# Patient Record
Sex: FEMALE | Race: BLACK OR AFRICAN AMERICAN | Employment: FULL TIME | URBAN - METROPOLITAN AREA
[De-identification: names, ages, dates, MRNs, and addresses within clinical notes are randomized per-mention and may not be internally consistent; named-entity substitution may affect disease eponyms.]

---

## 2021-07-19 LAB — PHQ9 SCORE: 1

## 2021-07-24 ENCOUNTER — TRANSFERRED RECORDS (OUTPATIENT)
Dept: HEALTH INFORMATION MANAGEMENT | Facility: CLINIC | Age: 41
End: 2021-07-24

## 2021-08-03 ENCOUNTER — TRANSFERRED RECORDS (OUTPATIENT)
Dept: HEALTH INFORMATION MANAGEMENT | Facility: CLINIC | Age: 41
End: 2021-08-03

## 2021-08-13 ENCOUNTER — TRANSFERRED RECORDS (OUTPATIENT)
Dept: HEALTH INFORMATION MANAGEMENT | Facility: CLINIC | Age: 41
End: 2021-08-13

## 2022-01-18 ENCOUNTER — OFFICE VISIT (OUTPATIENT)
Dept: FAMILY MEDICINE | Facility: CLINIC | Age: 42
End: 2022-01-18
Payer: COMMERCIAL

## 2022-01-18 VITALS
SYSTOLIC BLOOD PRESSURE: 100 MMHG | HEIGHT: 66 IN | WEIGHT: 133 LBS | DIASTOLIC BLOOD PRESSURE: 60 MMHG | OXYGEN SATURATION: 98 % | TEMPERATURE: 98.4 F | HEART RATE: 83 BPM | BODY MASS INDEX: 21.38 KG/M2

## 2022-01-18 DIAGNOSIS — N97.9 FEMALE INFERTILITY: ICD-10-CM

## 2022-01-18 DIAGNOSIS — Z13.220 LIPID SCREENING: ICD-10-CM

## 2022-01-18 DIAGNOSIS — Z00.00 ROUTINE GENERAL MEDICAL EXAMINATION AT A HEALTH CARE FACILITY: Primary | ICD-10-CM

## 2022-01-18 DIAGNOSIS — E55.9 AVITAMINOSIS D: ICD-10-CM

## 2022-01-18 DIAGNOSIS — Z12.31 ENCOUNTER FOR SCREENING MAMMOGRAM FOR BREAST CANCER: ICD-10-CM

## 2022-01-18 DIAGNOSIS — Z28.21 INFLUENZA VACCINATION DECLINED: ICD-10-CM

## 2022-01-18 LAB
ALBUMIN SERPL-MCNC: 3.5 G/DL (ref 3.4–5)
ALP SERPL-CCNC: 57 U/L (ref 40–150)
ALT SERPL W P-5'-P-CCNC: 17 U/L (ref 0–50)
ANION GAP SERPL CALCULATED.3IONS-SCNC: 9 MMOL/L (ref 3–14)
AST SERPL W P-5'-P-CCNC: 15 U/L (ref 0–45)
BILIRUB SERPL-MCNC: 0.4 MG/DL (ref 0.2–1.3)
BUN SERPL-MCNC: 11 MG/DL (ref 7–30)
CALCIUM SERPL-MCNC: 8.8 MG/DL (ref 8.5–10.1)
CHLORIDE BLD-SCNC: 109 MMOL/L (ref 94–109)
CO2 SERPL-SCNC: 21 MMOL/L (ref 20–32)
CREAT SERPL-MCNC: 0.71 MG/DL (ref 0.52–1.04)
DEPRECATED CALCIDIOL+CALCIFEROL SERPL-MC: 11 UG/L (ref 20–75)
ERYTHROCYTE [DISTWIDTH] IN BLOOD BY AUTOMATED COUNT: 15.4 % (ref 10–15)
GFR SERPL CREATININE-BSD FRML MDRD: >90 ML/MIN/1.73M2
GLUCOSE BLD-MCNC: 90 MG/DL (ref 70–99)
HCT VFR BLD AUTO: 37.6 % (ref 35–47)
HCV AB SERPL QL IA: NONREACTIVE
HGB BLD-MCNC: 12 G/DL (ref 11.7–15.7)
HIV 1+2 AB+HIV1 P24 AG SERPL QL IA: NONREACTIVE
MCH RBC QN AUTO: 26.9 PG (ref 26.5–33)
MCHC RBC AUTO-ENTMCNC: 31.9 G/DL (ref 31.5–36.5)
MCV RBC AUTO: 84 FL (ref 78–100)
PLATELET # BLD AUTO: 211 10E3/UL (ref 150–450)
POTASSIUM BLD-SCNC: 3.6 MMOL/L (ref 3.4–5.3)
PROT SERPL-MCNC: 7.7 G/DL (ref 6.8–8.8)
RBC # BLD AUTO: 4.46 10E6/UL (ref 3.8–5.2)
SODIUM SERPL-SCNC: 139 MMOL/L (ref 133–144)
TSH SERPL DL<=0.005 MIU/L-ACNC: 2.35 MU/L (ref 0.4–4)
WBC # BLD AUTO: 3.6 10E3/UL (ref 4–11)

## 2022-01-18 PROCEDURE — 84443 ASSAY THYROID STIM HORMONE: CPT | Performed by: FAMILY MEDICINE

## 2022-01-18 PROCEDURE — 82306 VITAMIN D 25 HYDROXY: CPT | Performed by: FAMILY MEDICINE

## 2022-01-18 PROCEDURE — 86803 HEPATITIS C AB TEST: CPT | Performed by: FAMILY MEDICINE

## 2022-01-18 PROCEDURE — 80053 COMPREHEN METABOLIC PANEL: CPT | Performed by: FAMILY MEDICINE

## 2022-01-18 PROCEDURE — 99386 PREV VISIT NEW AGE 40-64: CPT | Performed by: FAMILY MEDICINE

## 2022-01-18 PROCEDURE — 85027 COMPLETE CBC AUTOMATED: CPT | Performed by: FAMILY MEDICINE

## 2022-01-18 PROCEDURE — 99213 OFFICE O/P EST LOW 20 MIN: CPT | Mod: 25 | Performed by: FAMILY MEDICINE

## 2022-01-18 PROCEDURE — 87389 HIV-1 AG W/HIV-1&-2 AB AG IA: CPT | Performed by: FAMILY MEDICINE

## 2022-01-18 PROCEDURE — 36415 COLL VENOUS BLD VENIPUNCTURE: CPT | Performed by: FAMILY MEDICINE

## 2022-01-18 ASSESSMENT — ENCOUNTER SYMPTOMS
SORE THROAT: 0
ARTHRALGIAS: 0
CONSTIPATION: 0
HEARTBURN: 0
NERVOUS/ANXIOUS: 0
COUGH: 0
PARESTHESIAS: 0
BREAST MASS: 0
WEAKNESS: 0
DIARRHEA: 0
HEADACHES: 0
DIZZINESS: 0
JOINT SWELLING: 0
HEMATOCHEZIA: 0
FEVER: 0
SHORTNESS OF BREATH: 0
EYE PAIN: 0
CHILLS: 0
MYALGIAS: 0
NAUSEA: 0
PALPITATIONS: 0
HEMATURIA: 0
DYSURIA: 0
ABDOMINAL PAIN: 0
FREQUENCY: 0

## 2022-01-18 ASSESSMENT — MIFFLIN-ST. JEOR: SCORE: 1282.09

## 2022-01-18 NOTE — PROGRESS NOTES
SUBJECTIVE:   CC: Checo Hong is an 42 year old woman who presents for preventive health visit.     Patient has been advised of split billing requirements and indicates understanding: Yes  Healthy Habits:     Getting at least 3 servings of Calcium per day:  Yes    Bi-annual eye exam:  NO    Dental care twice a year:  Yes    Sleep apnea or symptoms of sleep apnea:  None    Diet:  Regular (no restrictions)    Frequency of exercise:  None    Taking medications regularly:  Not Applicable    Medication side effects:  Not applicable    PHQ-2 Total Score: 0    Additional concerns today:  Yes    She was previously going to [facility].   H/o two miscarriages - second one 4 months ago.     Today's PHQ-2 Score:   PHQ-2 ( 1999 [employer]) 1/18/2022   Q1: Little interest or pleasure in doing things 0   Q2: Feeling down, depressed or hopeless 0   PHQ-2 Score 0   Q1: Little interest or pleasure in doing things Not at all   Q2: Feeling down, depressed or hopeless Not at all   PHQ-2 Score 0       Abuse: Current or Past (Physical, Sexual or Emotional) - No  Do you feel safe in your environment? No    Have you ever done Advance Care Planning? (For example, a Health Directive, POLST, or a discussion with a medical provider or your loved ones about your wishes): No, advance care planning information given to patient to review.  Patient plans to discuss their wishes with loved ones or provider.      Social History     Tobacco Use     Smoking status: Not on file     Smokeless tobacco: Not on file   Substance Use Topics     Alcohol use: Not on file         Alcohol Use 1/18/2022   Prescreen: >3 drinks/day or >7 drinks/week? No       Reviewed orders with patient.  Reviewed health maintenance and updated orders accordingly - Yes      Breast Cancer Screening:    Breast CA Risk Assessment (FHS-7) 1/18/2022   Do you have a family history of breast, colon, or ovarian cancer? No / Unknown         Pertinent mammograms are reviewed under  "the imaging tab.    History of abnormal Pap smear: NO - age 30- 65 PAP every 3 years recommended     Reviewed and updated as needed this visit by clinical staff                Reviewed and updated as needed this visit by Provider                   Review of Systems   Constitutional: Negative for chills and fever.   HENT: Negative for congestion, ear pain, hearing loss and sore throat.    Eyes: Positive for visual disturbance. Negative for pain.   Respiratory: Negative for cough and shortness of breath.    Cardiovascular: Negative for chest pain, palpitations and peripheral edema.   Gastrointestinal: Negative for abdominal pain, constipation, diarrhea, heartburn, hematochezia and nausea.   Breasts:  Negative for tenderness, breast mass and discharge.   Genitourinary: Negative for dysuria, frequency, genital sores, hematuria, pelvic pain, urgency, vaginal bleeding and vaginal discharge.   Musculoskeletal: Negative for arthralgias, joint swelling and myalgias.   Skin: Negative for rash.   Neurological: Negative for dizziness, weakness, headaches and paresthesias.   Psychiatric/Behavioral: Negative for mood changes. The patient is not nervous/anxious.        OBJECTIVE:   Physical Exam   /60   Pulse 83   Temp 98.4  F (36.9  C)   Ht 1.664 m (5' 5.5\")   Wt 60.3 kg (133 lb)   LMP 12/10/2021   SpO2 98%   Breastfeeding No   BMI 21.80 kg/m    GENERAL: healthy, alert and no distress  EYES: Eyes grossly normal to inspection,conjunctivae and sclerae normal  HENT: ear canals and TM's normal, nose and mouth without ulcers or lesions  NECK: no adenopathy, no asymmetry, masses, or scars and thyroid normal to palpation  RESP: lungs clear to auscultation - no rales, rhonchi or wheezes  CV: regular rate and rhythm, normal S1 S2  ABDOMEN: soft, nontender, no hepatosplenomegaly, no masses and bowel sounds normal  MS: no gross musculoskeletal defects noted, no edema  SKIN: no suspicious lesions or rashes  NEURO: Normal " strength and tone, mentation intact and speech normal  PSYCH: mentation appears normal, affect normal/bright    Diagnostic Test Results:  Labs reviewed in Epic    ASSESSMENT/PLAN:       1. Routine general medical examination at a health care facility  Up to date with tetanus booster, received six years ago.   Per pt no history of abnormal pap smear. She has a pap smear 4 months ago which was normal. I am unable to see it in care everywhere and will obtain SAMM. In reviewing chart there was GYN order placed for abnormal pap and pt needed colposcopy  - Follow-up, discuss need for colpo for ASCUS/HPV. ASCUS and positive HPV - Needs a colposcopy - referral made     - CBC with platelets; Future  - Comprehensive metabolic panel (BMP + Alb, Alk Phos, ALT, AST, Total. Bili, TP); Future  - TSH with free T4 reflex; Future  - Vitamin D Deficiency; Future  - HIV Antigen Antibody Combo; Future  - Hepatitis C Screen Reflex to HCV RNA Quant and Genotype; Future  - REVIEW OF HEALTH MAINTENANCE PROTOCOL ORDERS  - CBC with platelets  - Comprehensive metabolic panel (BMP + Alb, Alk Phos, ALT, AST, Total. Bili, TP)  - TSH with free T4 reflex  - Vitamin D Deficiency  - HIV Antigen Antibody Combo  - Hepatitis C Screen Reflex to HCV RNA Quant and Genotype    2. Female infertility  - H/o two miscarriages, recent miscarriage 4 months ago. She is currently taking prenatal vitamins.   - Ob/Gyn Referral; Future    3. Encounter for screening mammogram for breast cancer  - MA Screening Digital Bilateral; Future    4. Influenza vaccination declined    5. Lipid screening   - lipid panel     Vitamin D levels low - tx Vitamin D 50, 000 U once per week X 12 weeks. Recheck labs in 12 weeks.  WBC low, recheck in 12 weeks.    Patient has been advised of split billing requirements and indicates understanding: Yes  COUNSELING:  Reviewed preventive health counseling, as reflected in patient instructions    There is no height or weight on file to calculate  BMI.        She has no history on file for tobacco use.      Counseling Resources:  ATP IV Guidelines  Pooled Cohorts Equation Calculator  Breast Cancer Risk Calculator  BRCA-Related Cancer Risk Assessment: FHS-7 Tool  FRAX Risk Assessment  ICSI Preventive Guidelines  Dietary Guidelines for Americans, 2010  USDA's MyPlate  ASA Prophylaxis  Lung CA Screening    Jimmy Bae MD  [facility]

## 2022-01-28 ENCOUNTER — TELEPHONE (OUTPATIENT)
Dept: FAMILY MEDICINE | Facility: CLINIC | Age: 42
End: 2022-01-28
Payer: COMMERCIAL

## 2022-01-28 DIAGNOSIS — D72.9 ABNORMAL WBC COUNT: Primary | ICD-10-CM

## 2022-01-28 RX ORDER — ERGOCALCIFEROL 1.25 MG/1
50000 CAPSULE, LIQUID FILLED ORAL WEEKLY
Qty: 12 CAPSULE | Refills: 0 | Status: SHIPPED | OUTPATIENT
Start: 2022-01-28 | End: 2022-04-16

## 2022-01-28 NOTE — TELEPHONE ENCOUNTER
Called patient with [employer]  #[phone]. Left voice mail message asking for patient to return call.    OB/GYN referral information:    [address] [address]   Phone: [phone]     Tracey Wu RN  [facility]

## 2022-01-28 NOTE — TELEPHONE ENCOUNTER
RN, I reviewed pts chart. She has h/o abnormal pap and would need colposcopy. If she hasn't had colp then please have her schedule with GYN.    Vitamin D low, tx Vitamin D 50, 000 U once per week X 12 weeks. Needs lab only visit after completing tx.     White count low, needs to recheck with next lab draw.     Rest of labs are stable.    Thanks!  DM

## 2022-02-01 NOTE — TELEPHONE ENCOUNTER
Writer called patient with [employer]  #[phone]: patient answered phone and stated she was unable to speak on phone because she is at work.    Patient stated she will call back tomorrow.  Patient requested clinic number be left on voicemail.    With help of [employer]  #[phone] writer left voicemail for patient with clinic number-ask to speak with a triage nurse.    AMEE BellaN, RN  [facility]

## 2022-02-03 NOTE — TELEPHONE ENCOUNTER
Pt called with [employer]     No answer, message was left for her to call us back.     Dr Bae, After it is established with pt if she needs a colposcopy or not would she need another referral placed as there is an OBGYN referral for infertility dated 1/18/22 but this is now a different issue or will this cover both issues?.    Flakita Edgar, RN, BSN  [facility]

## 2022-02-03 NOTE — TELEPHONE ENCOUNTER
Patient called with spouse. Caller advised per provider message below. They indicate understanding of the result and instructions for follow up and continued care. Lab appt scheduled 4/8/22 and call was transferred to [facility] to schedule for colp. They wanted [facility], provided them with # [phone]. They will also  rx. No further questions. Thank you.

## 2022-02-10 ENCOUNTER — TELEPHONE (OUTPATIENT)
Dept: OBGYN | Facility: CLINIC | Age: 42
End: 2022-02-10
Payer: COMMERCIAL

## 2022-02-10 NOTE — TELEPHONE ENCOUNTER
Routed to RD triage, [facility]. She needs to establish with GYN before colp can be scheduled. She can schedule wherever she wants to however based on messages it does not seem like she would want to be seen in [facility] which is where this clinic is located. Routed back to patient's FP.

## 2022-02-10 NOTE — TELEPHONE ENCOUNTER
Pls call pt's spouse Afshan to reschedule the [place] appt. He just got his new work schedule. The only day he can take her is 2/21 before 230. Pls call him asap to reschedule the [place] appt for next week. Thanks

## 2022-02-10 NOTE — TELEPHONE ENCOUNTER
Called pts spouse, left VM with phone number to schedule appt with GYN    AMEE YanezN RN  [facility]

## 2022-02-16 NOTE — TELEPHONE ENCOUNTER
Called number on pt's chart (no pt contacts listed) in the morning, was told to call back after 1:30. Called at 2:07 and LVM 2/16     Edit: spot is currently on hold on 3/24 with [facility] for r/s appt

## 2022-02-18 NOTE — TELEPHONE ENCOUNTER
Called pt, who gave us her spouse's #. Called spouse and scheduled a consult for 3/11 with Dr. Aggarwal due to [facility] being booked out. 2/18 SH

## 2022-03-11 ENCOUNTER — OFFICE VISIT (OUTPATIENT)
Dept: OBGYN | Facility: CLINIC | Age: 42
End: 2022-03-11
Payer: COMMERCIAL

## 2022-03-11 VITALS
SYSTOLIC BLOOD PRESSURE: 102 MMHG | BODY MASS INDEX: 22.4 KG/M2 | HEART RATE: 80 BPM | WEIGHT: 136.7 LBS | DIASTOLIC BLOOD PRESSURE: 64 MMHG | TEMPERATURE: 98.1 F | OXYGEN SATURATION: 97 %

## 2022-03-11 DIAGNOSIS — N96 HISTORY OF RECURRENT MISCARRIAGES: Primary | ICD-10-CM

## 2022-03-11 DIAGNOSIS — R87.610 ATYPICAL SQUAMOUS CELLS OF UNDETERMINED SIGNIFICANCE (ASCUS) ON PAPANICOLAOU SMEAR OF CERVIX: ICD-10-CM

## 2022-03-11 PROCEDURE — 99203 OFFICE O/P NEW LOW 30 MIN: CPT | Performed by: OBSTETRICS & GYNECOLOGY

## 2022-03-11 NOTE — PATIENT INSTRUCTIONS
If you would like to use the home ovulation tests, you should start testing every day starting on day 10 of your cycle (day 1 is the first day that you have bleeding from your period). You should also start having intercourse every day or every other day until the day after you have a positive test.     You should come to the lab on the third day of your period to have your blood work checked. You can go to any [place] lab. We would also recommend doing a test to check the uterus and fallopian tubes called an HSG (hysterosalpingogram). To schedule this test, please call our clinic at the start of your next period.     We would also recommend a Semen analysis, you can call [phone] ([phone]) to schedule.

## 2022-03-11 NOTE — PROGRESS NOTES
GYN Progress Note     CC: Follow up for infertility, recurrent pregnancy loss   HISTORY OF PRESENT ILLNESS:    Checo Hong is a 42 year old  who presents for evaluation of recurrent pregnancy loss. She presents today with her  who she requests to have act as her . She reports an early miscarriage about three years ago which resolved without medication or surgery and then it took 2 years for her to become pregnant again but that pregnancy ended in a spontaneous miscarriage at 5-6 weeks gestation in 2021 which resolved with expectant management. They have been trying to conceive since the miscarriage. They have been trying to time intercourse during her fertile window of the cycle (usually aiming for day 14 of her cycle) but she has not been using ovulation prediction tests.     She did have ovarian reserve testing about a year ago which showed a FSH of 5.8 and Estradiol of 84, AMH not done. She has not had any additional testing for recurrent miscarriage or fertility. Her partner has not had a semen analysis.      OB HISTORY:  OB History    Para Term  AB Living   2 0 0 0 2 0   SAB IAB Ectopic Multiple Live Births   2 0 0 0 0      # Outcome Date GA Lbr William/2nd Weight Sex Delivery Anes PTL Lv   2 SAB            1 SAB                     GYN HISTORY:  She has a reported history of an ASCUS pap with +HR HPV through [employer] in an office note on 21, unable to see actual pap smear result. She was referred for a colposcopy but that has not been done yet.   Denies history of STIs.   Reports regular periods every 28 days        PAST MEDICAL HISTORY:  Past Medical History:   Diagnosis Date     Vitamin D deficiency             PAST SURGICAL HISTORY:  Past Surgical History:   Procedure Laterality Date     NO HISTORY OF SURGERY            CURRENT MEDICATIONS:   Current Outpatient Medications   Medication Sig Dispense Refill     vitamin D2 (ERGOCALCIFEROL) 50309 units (1250  mcg) capsule Take 1 capsule (50,000 Units) by mouth once a week for 12 doses 12 capsule 0            ALLERGIES:  Patient has no known allergies.         SOCIAL HISTORY:  Social History     Tobacco Use     Smoking status: Never Smoker     Smokeless tobacco: Never Used   Substance Use Topics     Alcohol use: Never     Drug use: Never            FAMILY HISTORY:  No family history on file.     PHYSICAL EXAMINATION:  VS:/64   Pulse 80   Temp 98.1  F (36.7  C)   Wt 62 kg (136 lb 11.2 oz)   LMP 2022 (Exact Date)   SpO2 97%   Breastfeeding No   BMI 22.40 kg/m    Body mass index is 22.4 kg/m .    Physical exam deferred     Laboratory values:  Imaging findings:        ASSESSMENT:  Checo Hong is a 42 year old  who presents for evaluation of recurrent miscarriage and an ASCUS pap smear with +HR HPV     PLAN:  (N96) History of recurrent miscarriages  (primary encounter diagnosis)  -We reviewed etiologies of recurrent miscarriage and discussed that we would typically recommend evaluation after 2-3 consecutive miscarriages and after reviewing the options for further evaluation they would like to proceed with this testing. We reviewed the plan to check for antiphospholipid antibodies and plan for an HSG to assess for uterine anomalies as well as for tubal patency. She and her partner deny any known familial history of genetic disorders. We discussed the option for further testing but they will hold off on karyotype testing at this time.  -We also discussed that the most common etiology for miscarriage is sporadic chromosomal abnormalities and unfortunately this risk increases with advancing maternal age. We reviewed that the risk of miscarriage for women 40-44 years old is about 50% and that this is likely a contributing factor to her recent miscarriages.   -We also reviewed that since they have been trying to conceive for >6 months since their most recent miscarriage, I would recommend further testing  with ovarian reserve labs and a semen analysis for her partner.   -I also discussed that given her age and fertility concerns that I would recommend a referral with [facility] and that while it is possible for her to conceive on her own, she may need further interventions like IVF. They decline at this time and she would like to complete the above testing and then reassess.   -We also discussed monitoring her cycles with home ovulation tests and how to time intercourse to the most fertile time of her cycle.     (R87.610) Atypical squamous cells of undetermined significance (ASCUS) on Papanicolaou smear of cervix  -Message sent to scheduling team to schedule patient for colposcopy due to a history of an ASCUS pap smear with +HR HPV in 8/2021 (based on review of notes and patient's report, actual cytology report not visible in CareEverywhere).     Dispo: RTC for colposcopy     Shannon Aggarwal MD

## 2022-03-15 ENCOUNTER — TELEPHONE (OUTPATIENT)
Dept: OBGYN | Facility: CLINIC | Age: 42
End: 2022-03-15
Payer: COMMERCIAL

## 2022-03-15 NOTE — TELEPHONE ENCOUNTER
----- Message from Shannon Aggarwal MD sent at 3/12/2022  4:21 PM CST -----  Regarding: Colposcopy  Hi,     Can someone please help set Checo up for a colposcopy with me. She request that her  be called at [phone] to schedule this appointment.     Thank you!  Shannon

## 2022-03-25 ENCOUNTER — MYC MEDICAL ADVICE (OUTPATIENT)
Dept: OBGYN | Facility: CLINIC | Age: 42
End: 2022-03-25

## 2022-03-25 ENCOUNTER — LAB (OUTPATIENT)
Dept: LAB | Facility: CLINIC | Age: 42
End: 2022-03-25
Payer: COMMERCIAL

## 2022-03-25 DIAGNOSIS — N96 HISTORY OF RECURRENT MISCARRIAGES: ICD-10-CM

## 2022-03-25 DIAGNOSIS — Z13.220 LIPID SCREENING: ICD-10-CM

## 2022-03-25 DIAGNOSIS — D72.9 ABNORMAL WBC COUNT: ICD-10-CM

## 2022-03-25 DIAGNOSIS — E55.9 AVITAMINOSIS D: ICD-10-CM

## 2022-03-25 LAB
CHOLEST SERPL-MCNC: 157 MG/DL
DEPRECATED CALCIDIOL+CALCIFEROL SERPL-MC: 30 UG/L (ref 20–75)
ERYTHROCYTE [DISTWIDTH] IN BLOOD BY AUTOMATED COUNT: 14.4 % (ref 10–15)
ESTRADIOL SERPL-MCNC: 76 PG/ML
FASTING STATUS PATIENT QL REPORTED: YES
FSH SERPL-ACNC: 7.4 IU/L
HCT VFR BLD AUTO: 38.7 % (ref 35–47)
HDLC SERPL-MCNC: 42 MG/DL
HGB BLD-MCNC: 12.3 G/DL (ref 11.7–15.7)
LDLC SERPL CALC-MCNC: 93 MG/DL
MCH RBC QN AUTO: 27.8 PG (ref 26.5–33)
MCHC RBC AUTO-ENTMCNC: 31.8 G/DL (ref 31.5–36.5)
MCV RBC AUTO: 87 FL (ref 78–100)
NONHDLC SERPL-MCNC: 115 MG/DL
PLATELET # BLD AUTO: 226 10E3/UL (ref 150–450)
RBC # BLD AUTO: 4.43 10E6/UL (ref 3.8–5.2)
TRIGL SERPL-MCNC: 108 MG/DL
WBC # BLD AUTO: 3.7 10E3/UL (ref 4–11)

## 2022-03-25 PROCEDURE — 36415 COLL VENOUS BLD VENIPUNCTURE: CPT

## 2022-03-25 PROCEDURE — 83001 ASSAY OF GONADOTROPIN (FSH): CPT

## 2022-03-25 PROCEDURE — 83520 IMMUNOASSAY QUANT NOS NONAB: CPT | Mod: 90

## 2022-03-25 PROCEDURE — 99000 SPECIMEN HANDLING OFFICE-LAB: CPT

## 2022-03-25 PROCEDURE — 85730 THROMBOPLASTIN TIME PARTIAL: CPT

## 2022-03-25 PROCEDURE — 85027 COMPLETE CBC AUTOMATED: CPT

## 2022-03-25 PROCEDURE — 80061 LIPID PANEL: CPT

## 2022-03-25 PROCEDURE — 85390 FIBRINOLYSINS SCREEN I&R: CPT | Performed by: PATHOLOGY

## 2022-03-25 PROCEDURE — 85613 RUSSELL VIPER VENOM DILUTED: CPT

## 2022-03-25 PROCEDURE — 86146 BETA-2 GLYCOPROTEIN ANTIBODY: CPT

## 2022-03-25 PROCEDURE — 82306 VITAMIN D 25 HYDROXY: CPT

## 2022-03-25 PROCEDURE — 82670 ASSAY OF TOTAL ESTRADIOL: CPT

## 2022-03-28 LAB
B2 GLYCOPROT1 IGG SERPL IA-ACNC: 2 U/ML
B2 GLYCOPROT1 IGM SERPL IA-ACNC: 13 U/ML
DRVVT SCREEN RATIO: 1.03
INR PPP: 0.99 (ref 0.85–1.15)
LA PPP-IMP: NEGATIVE
LUPUS INTERPRETATION: NORMAL
PTT RATIO: 0.87
THROMBIN TIME: 17 SECONDS (ref 13–19)

## 2022-03-30 LAB — MIS SERPL-MCNC: 2.59 NG/ML

## 2022-04-04 ENCOUNTER — OFFICE VISIT (OUTPATIENT)
Dept: OBGYN | Facility: CLINIC | Age: 42
End: 2022-04-04
Payer: COMMERCIAL

## 2022-04-04 VITALS
BODY MASS INDEX: 22.09 KG/M2 | HEART RATE: 54 BPM | OXYGEN SATURATION: 100 % | SYSTOLIC BLOOD PRESSURE: 112 MMHG | TEMPERATURE: 97.7 F | DIASTOLIC BLOOD PRESSURE: 70 MMHG | WEIGHT: 134.8 LBS

## 2022-04-04 DIAGNOSIS — N97.9 INFERTILITY, FEMALE, SECONDARY: ICD-10-CM

## 2022-04-04 DIAGNOSIS — R87.810 ASCUS WITH POSITIVE HIGH RISK HPV CERVICAL: Primary | ICD-10-CM

## 2022-04-04 DIAGNOSIS — N96 HISTORY OF RECURRENT MISCARRIAGES: ICD-10-CM

## 2022-04-04 DIAGNOSIS — R87.610 ASCUS WITH POSITIVE HIGH RISK HPV CERVICAL: Primary | ICD-10-CM

## 2022-04-04 LAB — HCG UR QL: NEGATIVE

## 2022-04-04 PROCEDURE — 99213 OFFICE O/P EST LOW 20 MIN: CPT | Mod: 25 | Performed by: OBSTETRICS & GYNECOLOGY

## 2022-04-04 PROCEDURE — 57454 BX/CURETT OF CERVIX W/SCOPE: CPT | Performed by: OBSTETRICS & GYNECOLOGY

## 2022-04-04 PROCEDURE — 88305 TISSUE EXAM BY PATHOLOGIST: CPT | Performed by: PATHOLOGY

## 2022-04-04 PROCEDURE — 81025 URINE PREGNANCY TEST: CPT | Performed by: OBSTETRICS & GYNECOLOGY

## 2022-04-04 NOTE — PATIENT INSTRUCTIONS
Colposcopy Post-Procedure Patient Instructions    You may experience any of the following for a couple of days following colposcopy:    Mild cramping    Vaginal bleeding     Vaginal discharge that looks black and clumpy    Please call your healthcare provider if you have any of the following symptoms:    Fever--Temperature greater than 100 degrees    Cramping after 48 hours    Bleeding heavier than a normal period in the first 24-48 hours    If you are bleeding and soaking more than one pad an hour    Or any other worrisome problems.    We recommend that:    You use pads, not tampons for the bleeding.    You may resume sexual activity in 2-3 days, or after bleeding stops.    You may use Tylenol or ibuprofen (Motrin or Advil) for any discomfort.      [facility] - OB/GYN : [phone]    Please call our clinic to schedule the HSG (hysterosalpingogram). You should call on the first day of your period.     Here is the information for the fertility clinics we discussed:     [facility]   [email or web address]     [place] location:  [facility]  [address], [address]  [place], [place] [address] [phone]     [place] location:  [facility]  [address]  [place], [place] [id number] [phone]  __________________________________________________    [facility] - [place]  [address] [place]  [phone]  __________________________________________________    [facility]  [email or web address]    [place] Location:  [address], [address]  [place] [address]     [place] Location:  [address], [address]  [place] [address]  [phone]

## 2022-04-05 NOTE — PROGRESS NOTES
Office Colposcopy Procedure Note     CC: Evaluation of a ASCUS pap with +HR HPV     HPI:  Birth control method: none, trying to conceive                                                           Patient's pap smear history is as follows:  ASCUS Pap with +HR HPV in 8/2021, no other abnormal pap smears per patient report     Urine HCG: Negative     PROCEDURE NOTE:    We discussed that based on her pap testing we recommend further evaluation. She understands our recommendation to proceed with colposcopy. We discussed that colposcopy allows further evaluation of the cervix using magnification. I counseled the patient that biopsies may also be done at the time of the colposcopy. I discussed the procedure of colposcopy and counseled her that the colposcope magnifies the appearance of the cervix. Acetic acid or vinegar is placed on the cervix and vagina to stain the cells and to allow the clinician to better see where the abnormal cells are located and the size of any abnormal areas. The size, type and location of abnormal cells help to determine which area or areas may need to be biopsied. This information will further determine how severe the abnormality is and also help to determine what treatment, if any, is needed. When monitored and treated early, pre-cancerous areas usually do not develop into cervical cancer.       Written consent was obtained after ASCCP management guidelines were discussed and all patient's questions were answered. Final verification with two patient identifiers was performed.       Colposcopic exam of the cervix was performed before and after application of 3% acetic acid.  Adequate colposcopic evaluation (entire SCJ/transformation zone seen, no lesion in canal)  Acetowhite lesions: circumfrentailly around the cervical os in a geographic pattern.     Punctations: none    Mosaicism: none    Atypical vessels: none         2 biopsy(ies) taken.   Hemostasis obtained with silver nitrate.  ECC done  with Kevorkian curette and endocervical brush.         Colposcopic impression: FLACA I        Assessment/Plan  -Evaluation of a ASCUS pap smear   -Biopsies and ECC obtained, will contact patient with results and follow up plan   -Recommend pelvic rest for 72 hours, discussed bleeding/infection    Dispo: pending results of biopsy     Shannon Aggarwal MD

## 2022-04-06 LAB
PATH REPORT.COMMENTS IMP SPEC: NORMAL
PATH REPORT.COMMENTS IMP SPEC: NORMAL
PATH REPORT.FINAL DX SPEC: NORMAL
PATH REPORT.GROSS SPEC: NORMAL
PATH REPORT.MICROSCOPIC SPEC OTHER STN: NORMAL
PATH REPORT.RELEVANT HX SPEC: NORMAL
PHOTO IMAGE: NORMAL

## 2022-04-08 ENCOUNTER — TELEPHONE (OUTPATIENT)
Dept: OBGYN | Facility: CLINIC | Age: 42
End: 2022-04-08

## 2022-04-08 ENCOUNTER — LAB (OUTPATIENT)
Dept: LAB | Facility: CLINIC | Age: 42
End: 2022-04-08
Payer: COMMERCIAL

## 2022-04-08 DIAGNOSIS — N96 HISTORY OF RECURRENT MISCARRIAGES: Primary | ICD-10-CM

## 2022-04-08 PROCEDURE — 86146 BETA-2 GLYCOPROTEIN ANTIBODY: CPT | Mod: 59

## 2022-04-08 PROCEDURE — 86147 CARDIOLIPIN ANTIBODY EA IG: CPT | Mod: 59

## 2022-04-08 PROCEDURE — 36415 COLL VENOUS BLD VENIPUNCTURE: CPT

## 2022-04-08 NOTE — TELEPHONE ENCOUNTER
Result Care Coordination      Result Notes     Shannon Aggarwal MD   4/7/2022  5:46 PM CDT Back to Top        Please review colposcopy results with patient. All of her biopsies/ECC were negative for dysplasia and I would recommend a repeat pap smear in one year.      Thank you!  Shannon Aggarwal             [facility] with  #[phone] for patient to call back.   Trudi MERCADO RN

## 2022-04-08 NOTE — LETTER
[facility]  [address] [place] [address]  [facility] [address]  [phone]        April 14, 2022    Checo Hong                                                                                                            [address]  [place] [address]              Dear Checo,    I have reviewed your recent laboratory tests.  I am pleased to report that the following tests were normal.    Colposcopy pathology results. Negative for dysplasia. Your provider is recommending that come back in 1 year for repeat PAP smear.    If you have any problems or concerns, please call myself or my nurse at [phone]      Sincerely,        Shannon Aggarwal MD

## 2022-04-09 LAB
B2 GLYCOPROT1 IGG SERPL IA-ACNC: 1.8 U/ML
B2 GLYCOPROT1 IGM SERPL IA-ACNC: 12 U/ML
CARDIOLIPIN IGG SER IA-ACNC: 2.8 GPL-U/ML
CARDIOLIPIN IGG SER IA-ACNC: NEGATIVE
CARDIOLIPIN IGM SER IA-ACNC: 13 MPL-U/ML
CARDIOLIPIN IGM SER IA-ACNC: ABNORMAL

## 2022-04-12 NOTE — TELEPHONE ENCOUNTER
TC to Checo, no answer and no voicemail option. Will attempt TC one more time before sending letter.  Joya Tejeda RN

## 2022-05-29 ENCOUNTER — HEALTH MAINTENANCE LETTER (OUTPATIENT)
Age: 42
End: 2022-05-29

## 2022-07-11 ENCOUNTER — OFFICE VISIT (OUTPATIENT)
Dept: URGENT CARE | Facility: URGENT CARE | Age: 42
End: 2022-07-11
Payer: COMMERCIAL

## 2022-07-11 ENCOUNTER — DOCUMENTATION ONLY (OUTPATIENT)
Dept: LAB | Facility: CLINIC | Age: 42
End: 2022-07-11

## 2022-07-11 VITALS
DIASTOLIC BLOOD PRESSURE: 78 MMHG | BODY MASS INDEX: 21.05 KG/M2 | HEART RATE: 78 BPM | HEIGHT: 66 IN | OXYGEN SATURATION: 99 % | SYSTOLIC BLOOD PRESSURE: 114 MMHG | TEMPERATURE: 98.8 F | WEIGHT: 131 LBS | RESPIRATION RATE: 16 BRPM

## 2022-07-11 DIAGNOSIS — A04.8 H. PYLORI INFECTION: Primary | ICD-10-CM

## 2022-07-11 DIAGNOSIS — K21.9 GASTROESOPHAGEAL REFLUX DISEASE, UNSPECIFIED WHETHER ESOPHAGITIS PRESENT: ICD-10-CM

## 2022-07-11 DIAGNOSIS — D70.9 NEUTROPENIA, UNSPECIFIED TYPE (H): ICD-10-CM

## 2022-07-11 DIAGNOSIS — R10.13 ABDOMINAL PAIN, EPIGASTRIC: ICD-10-CM

## 2022-07-11 LAB
ALBUMIN UR-MCNC: NEGATIVE MG/DL
APPEARANCE UR: CLEAR
BACTERIA #/AREA URNS HPF: ABNORMAL /HPF
BASOPHILS # BLD AUTO: 0 10E3/UL (ref 0–0.2)
BASOPHILS NFR BLD AUTO: 0 %
BILIRUB UR QL STRIP: NEGATIVE
COLOR UR AUTO: YELLOW
EOSINOPHIL # BLD AUTO: 0 10E3/UL (ref 0–0.7)
EOSINOPHIL NFR BLD AUTO: 1 %
ERYTHROCYTE [DISTWIDTH] IN BLOOD BY AUTOMATED COUNT: 14.8 % (ref 10–15)
GLUCOSE UR STRIP-MCNC: NEGATIVE MG/DL
HCG UR QL: NEGATIVE
HCT VFR BLD AUTO: 38.6 % (ref 35–47)
HGB BLD-MCNC: 12.6 G/DL (ref 11.7–15.7)
HGB UR QL STRIP: ABNORMAL
IMM GRANULOCYTES # BLD: 0 10E3/UL
IMM GRANULOCYTES NFR BLD: 0 %
KETONES UR STRIP-MCNC: NEGATIVE MG/DL
LEUKOCYTE ESTERASE UR QL STRIP: NEGATIVE
LIPASE SERPL-CCNC: 25 U/L (ref 13–60)
LYMPHOCYTES # BLD AUTO: 1.2 10E3/UL (ref 0.8–5.3)
LYMPHOCYTES NFR BLD AUTO: 43 %
MCH RBC QN AUTO: 28.3 PG (ref 26.5–33)
MCHC RBC AUTO-ENTMCNC: 32.6 G/DL (ref 31.5–36.5)
MCV RBC AUTO: 87 FL (ref 78–100)
MONOCYTES # BLD AUTO: 0.3 10E3/UL (ref 0–1.3)
MONOCYTES NFR BLD AUTO: 11 %
NEUTROPHILS # BLD AUTO: 1.2 10E3/UL (ref 1.6–8.3)
NEUTROPHILS NFR BLD AUTO: 44 %
NITRATE UR QL: NEGATIVE
PH UR STRIP: 7 [PH] (ref 5–7)
PLATELET # BLD AUTO: 224 10E3/UL (ref 150–450)
RBC # BLD AUTO: 4.46 10E6/UL (ref 3.8–5.2)
RBC #/AREA URNS AUTO: ABNORMAL /HPF
SP GR UR STRIP: 1.01 (ref 1–1.03)
UROBILINOGEN UR STRIP-ACNC: 0.2 E.U./DL
WBC # BLD AUTO: 2.7 10E3/UL (ref 4–11)
WBC #/AREA URNS AUTO: ABNORMAL /HPF

## 2022-07-11 PROCEDURE — 36415 COLL VENOUS BLD VENIPUNCTURE: CPT | Performed by: PHYSICIAN ASSISTANT

## 2022-07-11 PROCEDURE — 81025 URINE PREGNANCY TEST: CPT | Performed by: PHYSICIAN ASSISTANT

## 2022-07-11 PROCEDURE — 85025 COMPLETE CBC W/AUTO DIFF WBC: CPT | Performed by: PHYSICIAN ASSISTANT

## 2022-07-11 PROCEDURE — 82150 ASSAY OF AMYLASE: CPT | Performed by: PHYSICIAN ASSISTANT

## 2022-07-11 PROCEDURE — 83690 ASSAY OF LIPASE: CPT | Performed by: PHYSICIAN ASSISTANT

## 2022-07-11 PROCEDURE — 81001 URINALYSIS AUTO W/SCOPE: CPT | Performed by: PHYSICIAN ASSISTANT

## 2022-07-11 PROCEDURE — 99214 OFFICE O/P EST MOD 30 MIN: CPT | Performed by: PHYSICIAN ASSISTANT

## 2022-07-11 PROCEDURE — 80053 COMPREHEN METABOLIC PANEL: CPT | Performed by: PHYSICIAN ASSISTANT

## 2022-07-11 RX ORDER — FAMOTIDINE 40 MG/1
40 TABLET, FILM COATED ORAL DAILY
Qty: 60 TABLET | Refills: 0 | Status: SHIPPED | OUTPATIENT
Start: 2022-07-11 | End: 2022-10-20

## 2022-07-11 ASSESSMENT — ENCOUNTER SYMPTOMS
NAUSEA: 0
TREMORS: 1
APPETITE CHANGE: 1
ABDOMINAL PAIN: 1
VOMITING: 1
FEVER: 0
SHORTNESS OF BREATH: 1
SORE THROAT: 0
DIARRHEA: 0

## 2022-07-11 NOTE — PROGRESS NOTES
SUBJECTIVE:   Checo Hong is a 42 year old female presenting with a chief complaint of   Chief Complaint   Patient presents with     Urgent Care     Fatigue     Headache, dizzy and fatigue x 1 week.        She is an established patient of [facility].  Patient presents with the above x more than a week, through  friend.  Sometimes she shakes when very hungry.  Decreased appetite because she feels that it gets stuck and then she vomits (force vomits with her finger) it out.  She feels that she can't breathe.  Occasional epigastragic pain.  Forces vomiting about every other day.  States worse with oily foods.   Last urinated this morning and can drink liquid.  States decreased liquids due to vomiting.  Patient states when she eats, she feels better.  Had same a few years ago.  Epigastric pain is burning.  No radiation.   Last BM this morning, normal.  No blood.      Treatment:  nothing  PMHx:  nothing  Shx:  none  Meds: daily chewable prenatal vitamin  Allergies: none  Social:  nothing  Family hx:      Review of Systems   Constitutional: Positive for appetite change. Negative for fever.   HENT: Negative for sore throat.    Respiratory: Positive for shortness of breath.    Gastrointestinal: Positive for abdominal pain and vomiting. Negative for diarrhea and nausea.   Neurological: Positive for tremors.   All other systems reviewed and are negative.      Past Medical History:   Diagnosis Date     Vitamin D deficiency      History reviewed. No pertinent family history.  Current Outpatient Medications   Medication Sig Dispense Refill     oagemcijt-txfojguva-fnflzstsu (PREVPAC) miscellaneous box As directed 1 each 0     amoxicillin (AMOXIL) 500 MG capsule Take 1 capsule (500 mg) by mouth 2 times daily for 14 days 28 capsule 0     clarithromycin (BIAXIN) 500 MG tablet Take 1 tablet (500 mg) by mouth 2 times daily for 10 days 20 tablet 0     esomeprazole (NEXIUM) 20 MG DR capsule Take 1 capsule (20 mg) by mouth every  "morning (before breakfast) for 14 days Take 30-60 minutes before eating. 14 capsule 0     famotidine (PEPCID) 40 MG tablet Take 1 tablet (40 mg) by mouth daily 60 tablet 0     omeprazole (PRILOSEC) 40 MG DR capsule Take 1 capsule (40 mg) by mouth daily 30 capsule 2     Social History     Tobacco Use     Smoking status: Never Smoker     Smokeless tobacco: Never Used   Substance Use Topics     Alcohol use: Never       OBJECTIVE  /78   Pulse 78   Temp 98.8  F (37.1  C) (Temporal)   Resp 16   Ht 1.664 m (5' 5.5\")   Wt 59.4 kg (131 lb)   LMP 06/14/2022   SpO2 99%   Breastfeeding No   BMI 21.47 kg/m      Physical Exam  Vitals and nursing note reviewed.   Constitutional:       Appearance: Normal appearance. She is normal weight.   Eyes:      Extraocular Movements: Extraocular movements intact.      Conjunctiva/sclera: Conjunctivae normal.   Cardiovascular:      Rate and Rhythm: Normal rate and regular rhythm.      Pulses: Normal pulses.      Heart sounds: Normal heart sounds.   Pulmonary:      Effort: Pulmonary effort is normal.      Breath sounds: Normal breath sounds.   Abdominal:      General: Abdomen is flat. Bowel sounds are normal.      Palpations: Abdomen is soft.      Tenderness: There is abdominal tenderness. There is no right CVA tenderness or left CVA tenderness.      Comments: RUQ and epigastric tenderness with palpation.  Some suprapubic tenderness as well.     Skin:     General: Skin is warm and dry.   Neurological:      General: No focal deficit present.      Mental Status: She is alert.   Psychiatric:         Mood and Affect: Mood normal.         Behavior: Behavior normal.          Labs:  No results found for this or any previous visit (from the past 24 hour(s)).    X-Ray was not done.    ASSESSMENT:      ICD-10-CM    1. H. pylori infection  A04.8 walurlqml-vtinlefkd-bhvouvcyi (PREVPAC) miscellaneous box     amoxicillin (AMOXIL) 500 MG capsule     clarithromycin (BIAXIN) 500 MG tablet     " esomeprazole (NEXIUM) 20 MG DR capsule   2. Abdominal pain, epigastric  R10.13 CBC with platelets and differential     Comprehensive metabolic panel (BMP + Alb, Alk Phos, ALT, AST, Total. Bili, TP)     Amylase     Lipase     UA Macro with Reflex to Micro and Culture - lab collect     HCG Qual, Urine ([id number])     Helicobacter pylori Antigen Stool     lidocaine (viscous) (XYLOCAINE) 2 % 15 mL, alum & mag hydroxide-simethicone (MAALOX) 15 mL GI Cocktail     CBC with platelets and differential     Comprehensive metabolic panel (BMP + Alb, Alk Phos, ALT, AST, Total. Bili, TP)     Amylase     Lipase     UA Macro with Reflex to Micro and Culture - lab collect     HCG Qual, Urine ([id number])     Urine Microscopic   3. Neutropenia, unspecified type (H)  D70.9    4. Gastroesophageal reflux disease, unspecified whether esophagitis present  K21.9 famotidine (PEPCID) 40 MG tablet        Medical Decision Making:    Differential Diagnosis:  Abdominal Pain: GERD/Ulcer, Pancreatitis, Hepatitis and Non Specific    Serious Comorbid Conditions:  Adult:  None    PLAN:  Improvement with GI cocktail.  Rx for prevacid.  H.Pylori and CMP, amylase and lipase pending.   I have gone through all the lab results with her  and expressed my concern about the neutropenia and the importance of PCP follow up.  Patient education.  Discussed reasons to seek immediate medical attention.        Followup:    If not improving or if condition worsens, follow up with your Primary Care Provider, If not improving or if conditions worsens over the next 12-24 hours, go to the Emergency Department    There are no Patient Instructions on file for this visit.

## 2022-07-12 LAB
ALBUMIN SERPL-MCNC: 3.6 G/DL (ref 3.4–5)
ALP SERPL-CCNC: 55 U/L (ref 40–150)
ALT SERPL W P-5'-P-CCNC: 15 U/L (ref 0–50)
AMYLASE SERPL-CCNC: 99 U/L (ref 30–110)
ANION GAP SERPL CALCULATED.3IONS-SCNC: 9 MMOL/L (ref 3–14)
AST SERPL W P-5'-P-CCNC: 13 U/L (ref 0–45)
BILIRUB SERPL-MCNC: 0.4 MG/DL (ref 0.2–1.3)
BUN SERPL-MCNC: 6 MG/DL (ref 7–30)
CALCIUM SERPL-MCNC: 9.5 MG/DL (ref 8.5–10.1)
CHLORIDE BLD-SCNC: 113 MMOL/L (ref 94–109)
CO2 SERPL-SCNC: 21 MMOL/L (ref 20–32)
CREAT SERPL-MCNC: 0.63 MG/DL (ref 0.52–1.04)
GFR SERPL CREATININE-BSD FRML MDRD: >90 ML/MIN/1.73M2
GLUCOSE BLD-MCNC: 85 MG/DL (ref 70–99)
POTASSIUM BLD-SCNC: 3.9 MMOL/L (ref 3.4–5.3)
PROT SERPL-MCNC: 7.6 G/DL (ref 6.8–8.8)
SODIUM SERPL-SCNC: 143 MMOL/L (ref 133–144)

## 2022-07-13 ENCOUNTER — NURSE TRIAGE (OUTPATIENT)
Dept: NURSING | Facility: CLINIC | Age: 42
End: 2022-07-13

## 2022-07-13 ENCOUNTER — OFFICE VISIT (OUTPATIENT)
Dept: URGENT CARE | Facility: URGENT CARE | Age: 42
End: 2022-07-13
Payer: COMMERCIAL

## 2022-07-13 VITALS
DIASTOLIC BLOOD PRESSURE: 70 MMHG | TEMPERATURE: 98.2 F | HEIGHT: 66 IN | HEART RATE: 84 BPM | SYSTOLIC BLOOD PRESSURE: 107 MMHG | WEIGHT: 131 LBS | OXYGEN SATURATION: 98 % | RESPIRATION RATE: 20 BRPM | BODY MASS INDEX: 21.05 KG/M2

## 2022-07-13 DIAGNOSIS — Z53.9 ERRONEOUS ENCOUNTER--DISREGARD: Primary | ICD-10-CM

## 2022-07-13 PROBLEM — O03.9 SPONTANEOUS ABORTION: Status: ACTIVE | Noted: 2021-08-03

## 2022-07-13 PROBLEM — O09.511 PRIMIGRAVIDA OF ADVANCED MATERNAL AGE IN FIRST TRIMESTER: Status: ACTIVE | Noted: 2021-07-06

## 2022-07-13 PROBLEM — R87.629 ABNORMAL PAP SMEAR OF VAGINA: Status: ACTIVE | Noted: 2021-08-03

## 2022-07-13 NOTE — PROGRESS NOTES
Erroneous encounter. Pt was advised to follow-up with primary care provider following urgent care visit 2 days ago. Has chronic fatigue, dizziness, GI upset with neutropenia.

## 2022-07-13 NOTE — PROGRESS NOTES
"Chief Complaint   Patient presents with     Urgent Care     RECHECK     Pt was here on Monday, symptoms are getting worsening. Weakness, dizziness and no apatite.  was interpret.        SUBJECTIVE:  Checo Hong is a 42 year old female who presents to the clinic today with her {parent:743366} for a rash.  Onset of rash was {NUMBERS 1-12:10} {TIME UNITS:2348} ago.   Rash is {TIMINGuc:307976}.   Location of the rash: ***  Quality/symptoms of rash: {RASH ASSOCIATED SYMPTOMS:327191}   Associated symptoms include: {RASH ASSOCIATED SYMPTOMS:339341}.  Symptoms are {SEVERITY:043614} and rash seems to be {SYMPTOM CHANGE (CP):35985}.  Previous history of a similar rash? {YES/NO:60}  Treatment measures tried include:  {RASH THERAPIES TRIED:255135}  Recent exposure history: {RASH EXPOSURE HISTORY:627467::\"none known\"}  Patient denies new meds, pets, foods, soaps, detergents, lotions, or enviornmental contacts.    Past Medical History:   Diagnosis Date     Vitamin D deficiency      famotidine (PEPCID) 40 MG tablet, Take 1 tablet (40 mg) by mouth daily    No current facility-administered medications on file prior to visit.    Social History     Tobacco Use     Smoking status: Never Smoker     Smokeless tobacco: Never Used   Substance Use Topics     Alcohol use: Never     No Known Allergies    Review of Systems    EXAM:   /70   Pulse 84   Temp 98.2  F (36.8  C) (Temporal)   Resp 20   Ht 1.664 m (5' 5.5\")   Wt 59.4 kg (131 lb)   LMP 06/14/2022   SpO2 98%   BMI 21.47 kg/m      Physical Exam    ASSESSMENT:  No diagnosis found.    PLAN:  There are no Patient Instructions on file for this visit.  Follow up with primary care provider with any problems, questions or concerns or if symptoms worsen or fail to improve. Patient agreed to plan and verbalized understanding.    Ita uJne, [facility]  "

## 2022-07-13 NOTE — TELEPHONE ENCOUNTER
calling, said someone called them and they don't know who so he's wondering if they're calling about the lab results. She is getting worse today. She has bad pain in her stomach. Different locations. She points everywhere where it hurts.  She has her period so the blood in urine is probably from that. Her  thinks it's anemia. She feels dizzy. Not feeling her hands, like she's losing control of her body.  He said he wants to bring her back in for evaluation. I told him that would be good since she is worsening.   Blanca Aburto RN  [facility]    Additional Information    Negative: Nursing judgment    Negative: Nursing judgment    Negative: Nursing judgment    Negative: Nursing judgment    Information only question and nurse able to answer    Protocols used: INFORMATION ONLY CALL - NO TRIAGE-A-OH

## 2022-07-14 ENCOUNTER — OFFICE VISIT (OUTPATIENT)
Dept: FAMILY MEDICINE | Facility: CLINIC | Age: 42
End: 2022-07-14
Payer: COMMERCIAL

## 2022-07-14 VITALS
DIASTOLIC BLOOD PRESSURE: 69 MMHG | RESPIRATION RATE: 16 BRPM | SYSTOLIC BLOOD PRESSURE: 108 MMHG | HEART RATE: 82 BPM | BODY MASS INDEX: 20.89 KG/M2 | HEIGHT: 66 IN | WEIGHT: 130 LBS | OXYGEN SATURATION: 99 % | TEMPERATURE: 98.2 F

## 2022-07-14 DIAGNOSIS — D72.9 ABNORMAL WBC COUNT: ICD-10-CM

## 2022-07-14 DIAGNOSIS — M54.9 UPPER BACK PAIN ON RIGHT SIDE: ICD-10-CM

## 2022-07-14 DIAGNOSIS — K29.70 GASTRITIS WITHOUT BLEEDING, UNSPECIFIED CHRONICITY, UNSPECIFIED GASTRITIS TYPE: Primary | ICD-10-CM

## 2022-07-14 DIAGNOSIS — R10.13 EPIGASTRIC PAIN: ICD-10-CM

## 2022-07-14 PROCEDURE — 99214 OFFICE O/P EST MOD 30 MIN: CPT | Performed by: FAMILY MEDICINE

## 2022-07-14 RX ORDER — OMEPRAZOLE 40 MG/1
40 CAPSULE, DELAYED RELEASE ORAL DAILY
Qty: 30 CAPSULE | Refills: 2 | Status: SHIPPED | OUTPATIENT
Start: 2022-07-14 | End: 2022-10-20

## 2022-07-14 ASSESSMENT — PATIENT HEALTH QUESTIONNAIRE - PHQ9
SUM OF ALL RESPONSES TO PHQ QUESTIONS 1-9: 13
10. IF YOU CHECKED OFF ANY PROBLEMS, HOW DIFFICULT HAVE THESE PROBLEMS MADE IT FOR YOU TO DO YOUR WORK, TAKE CARE OF THINGS AT HOME, OR GET ALONG WITH OTHER PEOPLE: SOMEWHAT DIFFICULT

## 2022-07-14 NOTE — PATIENT INSTRUCTIONS
Please call [phone] to schedule imagine ( ultrasound)     Prilosec 40 mg daily and Pepcid 40 mg daily, take medication 30 minutes before breakfast   Avoid fatty food, spicy food, citrus or caffeinated beverages.  No eating three hours before going to bed.

## 2022-07-14 NOTE — PROGRESS NOTES
Assessment & Plan     1. Gastritis without bleeding, unspecified chronicity, unspecified gastritis type  - discussed lifestyle modifications  - advised to stop any OTC NSAIDs  - advised to restart Pepcid 40 mg daily and Prilosec 40 mg daily   - omeprazole (PRILOSEC) 40 MG DR capsule; Take 1 capsule (40 mg) by mouth daily  Dispense: 30 capsule; Refill: 2  - advised to return h.pylori   - follow up scheduled 2 weeks     2. Epigastric pain  - r/u gallstones  - US Abdomen Limited; Future    3. Abnormal WBC count  - pt opted to have labs done at next appt     4. Upper back pain on right side  - likely MSK etiology  - recommended avoiding NSAID  - recommended tylenol PRN         Jimmy Bae MD  [facility]    Niko Kessler is a 42 year old accompanied by her spouse, presenting for the following health issues:  Hospital F/U (Urgent care)      History of Present Illness       Reason for visit:  Dizzy tired    She eats 2-3 servings of fruits and vegetables daily.She consumes 1 sweetened beverage(s) daily.She exercises with enough effort to increase her heart rate 9 or less minutes per day.  She exercises with enough effort to increase her heart rate 3 or less days per week.   She is taking medications regularly.    Today's PHQ-9         PHQ-9 Total Score: 13    PHQ-9 Q9 Thoughts of better off dead/self-harm past 2 weeks :   Not at all    How difficult have these problems made it for you to do your work, take care of things at home, or get along with other people: Somewhat difficult       ED/UC Followup:    Facility:  [facility]  Date of visit: 07/13/2022  Reason for visit: dizziness   Current Status: still dizzy and weak     Two weeks she feels that after she eats, foods get stuck. She has to drink water to bring food down. For a few days, she had heartburn which has improved. She feels that pepcid didn't work. She has intermittent nausea. No vomiting, fever, chills, constipation or  diarrhea. She has low energy due to lack of eating. She feels that initially symptoms started with heartburn. She didn't like food getting stuck       She has pain in pain in right upper back for two weeks. Pain started after sitting underneath AC. Whenever she feels cold, then she feels the pain. Pain is mild, radiating to the lower back and relieved with not feeling cold.     Review of Systems         Objective    LMP 06/14/2022   There is no height or weight on file to calculate BMI.  Physical Exam                       .  ..

## 2022-07-14 NOTE — LETTER
July 14, 2022      Checo Hong  [address]  [place] [address]        To Whom It May Concern,       Please excuse Checo Hong from work due to recent illness. Please call me if you have any questions or concerns.     Sincerely,        Jimmy Bae MD

## 2022-07-15 ENCOUNTER — LAB (OUTPATIENT)
Dept: LAB | Facility: CLINIC | Age: 42
End: 2022-07-15
Payer: COMMERCIAL

## 2022-07-15 DIAGNOSIS — R10.13 ABDOMINAL PAIN, EPIGASTRIC: ICD-10-CM

## 2022-07-15 PROCEDURE — 87338 HPYLORI STOOL AG IA: CPT

## 2022-07-18 ENCOUNTER — MYC MEDICAL ADVICE (OUTPATIENT)
Dept: FAMILY MEDICINE | Facility: CLINIC | Age: 42
End: 2022-07-18

## 2022-07-18 LAB — H PYLORI AG STL QL IA: POSITIVE

## 2022-07-18 RX ORDER — CLARITHROMYCIN 500 MG
500 TABLET ORAL 2 TIMES DAILY
Qty: 20 TABLET | Refills: 0 | Status: SHIPPED | OUTPATIENT
Start: 2022-07-18 | End: 2022-07-28

## 2022-07-18 RX ORDER — AMOXICILLIN 500 MG/1
500 CAPSULE ORAL 2 TIMES DAILY
Qty: 28 CAPSULE | Refills: 0 | Status: SHIPPED | OUTPATIENT
Start: 2022-07-18 | End: 2022-08-01

## 2022-07-20 NOTE — TELEPHONE ENCOUNTER
Writer responded via [employer].    AMEE BellaN, RN  [facility]

## 2022-07-23 ENCOUNTER — HOSPITAL ENCOUNTER (OUTPATIENT)
Dept: ULTRASOUND IMAGING | Facility: CLINIC | Age: 42
Discharge: HOME | End: 2022-07-23
Attending: FAMILY MEDICINE | Admitting: FAMILY MEDICINE
Payer: COMMERCIAL

## 2022-07-23 DIAGNOSIS — R10.13 EPIGASTRIC PAIN: ICD-10-CM

## 2022-07-23 PROCEDURE — 76705 ECHO EXAM OF ABDOMEN: CPT

## 2022-07-29 ENCOUNTER — OFFICE VISIT (OUTPATIENT)
Dept: FAMILY MEDICINE | Facility: CLINIC | Age: 42
End: 2022-07-29
Payer: COMMERCIAL

## 2022-07-29 VITALS
DIASTOLIC BLOOD PRESSURE: 60 MMHG | SYSTOLIC BLOOD PRESSURE: 96 MMHG | HEIGHT: 66 IN | HEART RATE: 84 BPM | WEIGHT: 128.8 LBS | BODY MASS INDEX: 20.7 KG/M2 | TEMPERATURE: 97.7 F | OXYGEN SATURATION: 99 % | RESPIRATION RATE: 18 BRPM

## 2022-07-29 DIAGNOSIS — A04.8 HELICOBACTER PYLORI (H. PYLORI) INFECTION: Primary | ICD-10-CM

## 2022-07-29 PROCEDURE — 99207 PR NO CHARGE LOS: CPT | Performed by: FAMILY MEDICINE

## 2022-10-03 ENCOUNTER — HEALTH MAINTENANCE LETTER (OUTPATIENT)
Age: 42
End: 2022-10-03

## 2022-10-20 ENCOUNTER — OFFICE VISIT (OUTPATIENT)
Dept: FAMILY MEDICINE | Facility: CLINIC | Age: 42
End: 2022-10-20
Payer: COMMERCIAL

## 2022-10-20 VITALS
BODY MASS INDEX: 22.23 KG/M2 | WEIGHT: 133.4 LBS | HEIGHT: 65 IN | SYSTOLIC BLOOD PRESSURE: 102 MMHG | HEART RATE: 85 BPM | OXYGEN SATURATION: 98 % | TEMPERATURE: 97.7 F | RESPIRATION RATE: 20 BRPM | DIASTOLIC BLOOD PRESSURE: 60 MMHG

## 2022-10-20 DIAGNOSIS — Z86.19 HISTORY OF HELICOBACTER INFECTION: Primary | ICD-10-CM

## 2022-10-20 PROCEDURE — 99214 OFFICE O/P EST MOD 30 MIN: CPT | Performed by: FAMILY MEDICINE

## 2022-10-20 NOTE — PROGRESS NOTES
Assessment & Plan     History of Helicobacter infection  - recheck labs to ensure infection has cleared  - advised below  For as needed medication - you can use over the counter tums as needed.   Plan: Helicobacter pylori Antigen Stool          Jimmy Bae MD  [facility]    Niko Kessler is a 42 year old accompanied by her  presenting for the following health issues:  Follow Up      HPI     She completed h.pylori medication. She wasn't able to get an appointment and was seen at outside facility. She had testing done which showed no h.pylori. She was given sucralfate. She is currently doing fine and has some pain after eating eggs. She modified her diet.         Review of Systems         Objective    There were no vitals taken for this visit.  There is no height or weight on file to calculate BMI.  Physical Exam

## 2022-10-24 PROCEDURE — 87338 HPYLORI STOOL AG IA: CPT | Performed by: FAMILY MEDICINE

## 2022-10-25 LAB — H PYLORI AG STL QL IA: NEGATIVE

## 2023-01-23 ENCOUNTER — OFFICE VISIT (OUTPATIENT)
Dept: FAMILY MEDICINE | Facility: CLINIC | Age: 43
End: 2023-01-23
Payer: COMMERCIAL

## 2023-01-23 VITALS
RESPIRATION RATE: 17 BRPM | HEIGHT: 66 IN | BODY MASS INDEX: 22.02 KG/M2 | TEMPERATURE: 98 F | SYSTOLIC BLOOD PRESSURE: 108 MMHG | DIASTOLIC BLOOD PRESSURE: 68 MMHG | HEART RATE: 90 BPM | WEIGHT: 137 LBS | OXYGEN SATURATION: 98 %

## 2023-01-23 DIAGNOSIS — Z32.00 PREGNANCY EXAMINATION OR TEST, PREGNANCY UNCONFIRMED: Primary | ICD-10-CM

## 2023-01-23 DIAGNOSIS — Z32.01 PREGNANCY TEST POSITIVE: ICD-10-CM

## 2023-01-23 LAB — HCG UR QL: POSITIVE

## 2023-01-23 PROCEDURE — 99213 OFFICE O/P EST LOW 20 MIN: CPT | Performed by: FAMILY MEDICINE

## 2023-01-23 PROCEDURE — 81025 URINE PREGNANCY TEST: CPT | Performed by: FAMILY MEDICINE

## 2023-01-23 ASSESSMENT — PAIN SCALES - GENERAL: PAINLEVEL: NO PAIN (0)

## 2023-01-23 NOTE — PROGRESS NOTES
Assessment & Plan     1. Pregnancy examination or test, pregnancy unconfirmed  - HCG qualitative urine; Future  - HCG qualitative urine    2. Pregnancy test positive  - Discussed starting prenatal vitamins  - Discussed concerning symptoms and to call OB/GYN clinic if experiencing any vaginal bleeding or pelvic pain  - Ob/Gyn Referral; Future      Jimmy Bae MD  [facility]    Niko Kessler is a 43 year old, presenting for the following health issues:  No chief complaint on file.      History of Present Illness       Reason for visit:  Pregnancy    She eats 2-3 servings of fruits and vegetables daily.She consumes 0 sweetened beverage(s) daily.She exercises with enough effort to increase her heart rate 9 or less minutes per day.  She exercises with enough effort to increase her heart rate 3 or less days per week.   She is taking medications regularly.       Pregnancy Test/Confirmation      Chceo is a43 year old Woman,  who presents requesting a pregnancy test.   Her LMP 23   Symptoms of pregnancy: none  Any Bleeding since LMP? no    If pregnant, pregnancy is Planned, Desired                Review of Systems         Objective    There were no vitals taken for this visit.  There is no height or weight on file to calculate BMI.  Physical Exam

## 2023-02-09 ENCOUNTER — OFFICE VISIT (OUTPATIENT)
Dept: FAMILY MEDICINE | Facility: CLINIC | Age: 43
End: 2023-02-09
Payer: COMMERCIAL

## 2023-02-09 VITALS
WEIGHT: 137.12 LBS | DIASTOLIC BLOOD PRESSURE: 63 MMHG | HEART RATE: 80 BPM | SYSTOLIC BLOOD PRESSURE: 98 MMHG | OXYGEN SATURATION: 100 % | BODY MASS INDEX: 22.13 KG/M2 | TEMPERATURE: 97.3 F | RESPIRATION RATE: 16 BRPM

## 2023-02-09 DIAGNOSIS — D70.9 NEUTROPENIA, UNSPECIFIED TYPE (H): ICD-10-CM

## 2023-02-09 DIAGNOSIS — O03.9 MISCARRIAGE AT 8 TO 28 WEEKS GESTATION: Primary | ICD-10-CM

## 2023-02-09 LAB
ERYTHROCYTE [DISTWIDTH] IN BLOOD BY AUTOMATED COUNT: 15.4 % (ref 10–15)
HCG INTACT+B SERPL-ACNC: 595 MIU/ML
HCT VFR BLD AUTO: 35 % (ref 35–47)
HGB BLD-MCNC: 11.1 G/DL (ref 11.7–15.7)
MCH RBC QN AUTO: 27.3 PG (ref 26.5–33)
MCHC RBC AUTO-ENTMCNC: 31.7 G/DL (ref 31.5–36.5)
MCV RBC AUTO: 86 FL (ref 78–100)
PLATELET # BLD AUTO: 191 10E3/UL (ref 150–450)
RBC # BLD AUTO: 4.07 10E6/UL (ref 3.8–5.2)
WBC # BLD AUTO: 3.3 10E3/UL (ref 4–11)

## 2023-02-09 PROCEDURE — 99213 OFFICE O/P EST LOW 20 MIN: CPT | Performed by: NURSE PRACTITIONER

## 2023-02-09 PROCEDURE — 84702 CHORIONIC GONADOTROPIN TEST: CPT | Performed by: NURSE PRACTITIONER

## 2023-02-09 PROCEDURE — 36415 COLL VENOUS BLD VENIPUNCTURE: CPT | Performed by: NURSE PRACTITIONER

## 2023-02-09 PROCEDURE — 85027 COMPLETE CBC AUTOMATED: CPT | Performed by: NURSE PRACTITIONER

## 2023-02-09 NOTE — PROGRESS NOTES
Assessment & Plan     Miscarriage at 8 to 28 weeks gestation  Following up from ED visit.  Continues to have bleeding that is getting lighter.  No fevers or cramping.  Recommended follow up with OBGYN to discuss fertility.    - CBC with platelets; Future  - HCG quantitative pregnancy; Future  - CBC with platelets  - HCG quantitative pregnancy    Neutropenia, unspecified type (H)  Repeating CBC today.             MED REC REQUIRED  Post Medication Reconciliation Status: discharge medications reconciled, continue medications without change      No follow-ups on file.    KITTY Figueredo CNP  [facility]    Niko Kessler is a 43 year old accompanied by her spouse, presenting for the following health issues:  Hospital F/U      [facility]   ED follow up for miscarriage.    3rd miscarriage, first was in 2019.  Second pregancy in 2021.    Last 2 pregancy, had heavy cramping when miscarried.  This one she didn't feel any pain.    Continues to have some bleeding.  No fevers, no cramping.  Has a referral to OBGYN, has not followed up.     Review of Systems   Constitutional, HEENT, cardiovascular, pulmonary, gi and gu systems are negative, except as otherwise noted.      Objective    BP 98/63 (BP Location: Left arm, Patient Position: Sitting, Cuff Size: Adult Regular)   Pulse 80   Temp 97.3  F (36.3  C) (Temporal)   Resp 16   Wt 62.2 kg (137 lb 1.9 oz)   LMP 11/23/2022 (Approximate)   SpO2 100%   BMI 22.13 kg/m    Body mass index is 22.13 kg/m .  Physical Exam   GENERAL: healthy, alert and no distress  EYES: Eyes grossly normal to inspection, PERRL and conjunctivae and sclerae normal  NECK: no adenopathy, no asymmetry, masses, or scars and thyroid normal to palpation  RESP: lungs clear to auscultation - no rales, rhonchi or wheezes  ABDOMEN: soft, nontender  MS: no gross musculoskeletal defects noted, no edema

## 2023-03-06 ENCOUNTER — OFFICE VISIT (OUTPATIENT)
Dept: MIDWIFE SERVICES | Facility: CLINIC | Age: 43
End: 2023-03-06
Payer: COMMERCIAL

## 2023-03-06 VITALS
HEART RATE: 67 BPM | BODY MASS INDEX: 21.63 KG/M2 | DIASTOLIC BLOOD PRESSURE: 60 MMHG | SYSTOLIC BLOOD PRESSURE: 98 MMHG | OXYGEN SATURATION: 99 % | WEIGHT: 134 LBS

## 2023-03-06 DIAGNOSIS — N96 RECURRENT PREGNANCY LOSS: ICD-10-CM

## 2023-03-06 DIAGNOSIS — N97.9 FEMALE INFERTILITY: Primary | ICD-10-CM

## 2023-03-06 PROBLEM — O09.511 PRIMIGRAVIDA OF ADVANCED MATERNAL AGE IN FIRST TRIMESTER: Status: RESOLVED | Noted: 2021-07-06 | Resolved: 2023-03-06

## 2023-03-06 PROCEDURE — 99203 OFFICE O/P NEW LOW 30 MIN: CPT | Performed by: ADVANCED PRACTICE MIDWIFE

## 2023-03-06 NOTE — PROGRESS NOTES
"S:  Checo is here with her  Rhiannon who is speaking as  for her. They are here to discuss history of recurrent miscarriage and infertility. Checo is interested in becoming pregnant and would like to know why she has had three miscarriages.    Checo has a history of SAB x 3 in 2019, 2021, and most recently on 2/7/2023. She reports each pregnancy was around 8w when she experienced miscarriage.     Checo and Rhiannon have been trying to conceive for 4 years. She tracks menstrual cycles and tracks when she is ovulating each month to time intercourse with calendar but has not used OPKs.     Menstrual cycles: reports monthly with 1-2 day variation; bleeding x 7 days    Checo had infertility workup 1 year ago in March 2023 with OBGYN Dr. Aggarwal. Labs included FSH, estradiol, AMH, beta 2 glycoprotein antibodies IgG and IgM, lupus anticoagulant panel, and cardiolipin antibody. She has also had recent TSH, vitamin D, and lipid panel. All lab results are WNL except \"weak positive\" cardiolipin antibody IgM that was repeated once. However, she does not meet criteria for antiphospholipid antibody syndrome. Dr. Aggarwal recommended patient seek care at fertility clinic and also have HSG and sperm analysis done.     In discussion with Checo and Rhiannon, they say that they will consider IVF as a \"last resort\" and have not yet sought care at fertility clinic because she got pregnant again and they were hopeful. Now, they are hoping to repeat infertility lab work to see if there is something that can be treated to help her get pregnant.    Surgical hx: none  Family hx: none    O:  BP 98/60   Pulse 67   Wt 60.8 kg (134 lb)   LMP 11/23/2022 (Approximate)   SpO2 99%   BMI 21.63 kg/m      Fertility lab work:   Latest Reference Range & Units 03/25/22 07:36 04/08/22 10:23   Anti-Mullerian Hormone <=6.282 ng/mL 2.589    Beta 2 Glycoprotein 1 Antibody IgG <7.0 U/mL 2.0 1.8   Beta 2 Glycoprotein 1 Antibody IgM <7.0 U/mL 13.0 " (H) 12.0 (H)   Cardiolipin IgG Neisha Negative   Negative   Cardiolipin IgM Neisha Negative   Weak Positive !   FSH IU/L 7.4       Latest Reference Range & Units 03/25/22 07:36   Estradiol pg/mL 76      Latest Reference Range & Units 01/18/22 10:47 03/25/22 07:36 03/25/22 07:37 07/11/22 11:23   HDL Cholesterol >=50 mg/dL   42 (L)    LDL Cholesterol Calculated <=100 mg/dL   93    Lipase 13 - 60 U/L    25   Non HDL Cholesterol <130 mg/dL   115    Triglycerides <150 mg/dL   108    TSH 0.40 - 4.00 mU/L 2.35      Vitamin D Deficiency screening 20 - 75 ug/L 11 (L) 30       A/P:  (N97.9) Female infertility  (primary encounter diagnosis)    (N96) Recurrent pregnancy loss    Discussed diagnoses of infertility and recurrent pregnancy loss in detail. Discussed different reasons for miscarriage and increasing risk of recurrent pregnancy loss with each successive loss. Discussed that diagnosis of infertility can be made after 6 months of not conceiving after age 35, and reiterated that she meets this criteria after trying to conceive for 4 years. Discussed declining fertility at age 43. Recommended they seek care with [facility] at fertility clinic and emphasized that I believe it is time to consider IVF. Discussed that they may not learn why she has not been able to conceive or why she has had recurrent pregnancy loss, and that they should seek additional fertility treatment to try to conceive rather than waiting for an answer of why she has not conceived yet.     Recommended that she not repeat lab work today since she had workup just one year ago that was WNL. Only additional labs/tests that could be considered are Hgb A1c, TPO antibodies, HSG, and sperm analysis.    Gave referral information for three fertility clinics in [facility]: [facility], [facility], and [facility]. Explored the websites of these clinics with patient and sent [employer] message with website and phone number for each clinic.     KITTY Loving CNM

## 2023-05-20 ENCOUNTER — HEALTH MAINTENANCE LETTER (OUTPATIENT)
Age: 43
End: 2023-05-20

## 2024-03-09 ENCOUNTER — HEALTH MAINTENANCE LETTER (OUTPATIENT)
Age: 44
End: 2024-03-09

## 2024-07-27 ENCOUNTER — HEALTH MAINTENANCE LETTER (OUTPATIENT)
Age: 44
End: 2024-07-27

## 2024-11-06 ASSESSMENT — PATIENT HEALTH QUESTIONNAIRE - PHQ9: SUM OF ALL RESPONSES TO PHQ QUESTIONS 1-9: 13

## 2025-02-24 ENCOUNTER — TELEPHONE (OUTPATIENT)
Dept: FAMILY MEDICINE | Facility: CLINIC | Age: 45
End: 2025-02-24
Payer: COMMERCIAL

## 2025-02-24 NOTE — TELEPHONE ENCOUNTER
Appt scheduled tomorrow and family is scheduled at 3:20PM. Per Robert okay to arrive together at 10:50am time.    Attempted to inform of information; however, mailbox is not set up.    Lvm if Ghebre would like to come with Checo that would be fine.

## 2025-02-25 ENCOUNTER — OFFICE VISIT (OUTPATIENT)
Dept: FAMILY MEDICINE | Facility: CLINIC | Age: 45
End: 2025-02-25
Payer: COMMERCIAL

## 2025-02-25 VITALS
HEART RATE: 84 BPM | HEIGHT: 66 IN | DIASTOLIC BLOOD PRESSURE: 67 MMHG | TEMPERATURE: 97.6 F | WEIGHT: 148 LBS | BODY MASS INDEX: 23.78 KG/M2 | SYSTOLIC BLOOD PRESSURE: 100 MMHG | OXYGEN SATURATION: 97 % | RESPIRATION RATE: 16 BRPM

## 2025-02-25 DIAGNOSIS — R10.13 EPIGASTRIC PAIN: Primary | ICD-10-CM

## 2025-02-25 DIAGNOSIS — Z86.19 HISTORY OF HELICOBACTER PYLORI INFECTION: ICD-10-CM

## 2025-02-25 LAB
ALBUMIN SERPL BCG-MCNC: 4 G/DL (ref 3.5–5.2)
ALP SERPL-CCNC: 57 U/L (ref 40–150)
ALT SERPL W P-5'-P-CCNC: 12 U/L (ref 0–50)
ANION GAP SERPL CALCULATED.3IONS-SCNC: 10 MMOL/L (ref 7–15)
AST SERPL W P-5'-P-CCNC: 22 U/L (ref 0–45)
BILIRUB SERPL-MCNC: 0.3 MG/DL
BUN SERPL-MCNC: 8.9 MG/DL (ref 6–20)
CALCIUM SERPL-MCNC: 8.7 MG/DL (ref 8.8–10.4)
CHLORIDE SERPL-SCNC: 104 MMOL/L (ref 98–107)
CREAT SERPL-MCNC: 0.62 MG/DL (ref 0.51–0.95)
EGFRCR SERPLBLD CKD-EPI 2021: >90 ML/MIN/1.73M2
ERYTHROCYTE [DISTWIDTH] IN BLOOD BY AUTOMATED COUNT: 13.3 % (ref 10–15)
GLUCOSE SERPL-MCNC: 108 MG/DL (ref 70–99)
HCO3 SERPL-SCNC: 25 MMOL/L (ref 22–29)
HCT VFR BLD AUTO: 38.4 % (ref 35–47)
HGB BLD-MCNC: 12.1 G/DL (ref 11.7–15.7)
LIPASE SERPL-CCNC: 27 U/L (ref 13–60)
MCH RBC QN AUTO: 28.6 PG (ref 26.5–33)
MCHC RBC AUTO-ENTMCNC: 31.5 G/DL (ref 31.5–36.5)
MCV RBC AUTO: 91 FL (ref 78–100)
PLATELET # BLD AUTO: 214 10E3/UL (ref 150–450)
POTASSIUM SERPL-SCNC: 3.6 MMOL/L (ref 3.4–5.3)
PROT SERPL-MCNC: 6.9 G/DL (ref 6.4–8.3)
RBC # BLD AUTO: 4.23 10E6/UL (ref 3.8–5.2)
SODIUM SERPL-SCNC: 139 MMOL/L (ref 135–145)
WBC # BLD AUTO: 3.4 10E3/UL (ref 4–11)

## 2025-02-25 PROCEDURE — 80053 COMPREHEN METABOLIC PANEL: CPT | Performed by: PHYSICIAN ASSISTANT

## 2025-02-25 PROCEDURE — 83690 ASSAY OF LIPASE: CPT | Performed by: PHYSICIAN ASSISTANT

## 2025-02-25 PROCEDURE — 85027 COMPLETE CBC AUTOMATED: CPT | Performed by: PHYSICIAN ASSISTANT

## 2025-02-25 PROCEDURE — 99214 OFFICE O/P EST MOD 30 MIN: CPT | Performed by: PHYSICIAN ASSISTANT

## 2025-02-25 PROCEDURE — 1126F AMNT PAIN NOTED NONE PRSNT: CPT | Performed by: PHYSICIAN ASSISTANT

## 2025-02-25 PROCEDURE — 3074F SYST BP LT 130 MM HG: CPT | Performed by: PHYSICIAN ASSISTANT

## 2025-02-25 PROCEDURE — G2211 COMPLEX E/M VISIT ADD ON: HCPCS | Performed by: PHYSICIAN ASSISTANT

## 2025-02-25 PROCEDURE — 3078F DIAST BP <80 MM HG: CPT | Performed by: PHYSICIAN ASSISTANT

## 2025-02-25 PROCEDURE — 36415 COLL VENOUS BLD VENIPUNCTURE: CPT | Performed by: PHYSICIAN ASSISTANT

## 2025-02-25 ASSESSMENT — PAIN SCALES - GENERAL: PAINLEVEL_OUTOF10: NO PAIN (0)

## 2025-02-25 NOTE — PROGRESS NOTES
Assessment & Plan     (R10.13) Epigastric pain  (primary encounter diagnosis)  Comment:   Plan: Helicobacter pylori Antigen Stool, CBC with         platelets, Comprehensive metabolic panel (BMP +        Alb, Alk Phos, ALT, AST, Total. Bili, TP),         Lipase            (Z86.19) History of Helicobacter pylori infection  Comment:   Plan: Helicobacter pylori Antigen Stool, CBC with         platelets, Comprehensive metabolic panel (BMP +        Alb, Alk Phos, ALT, AST, Total. Bili, TP),         Lipase          Blood work as above today to rule out other causes of epigastric pain, if H. pylori testing is negative consider starting omeprazole 20 mg once daily.  If H. pylori comes back positive we will treat with appropriate regimen.  Consider upper endoscopy in the future.        Niko Kessler is a 45 year old, presenting for the following health issues:  H Pylori        2/25/2025    11:07 AM   Additional Questions   Roomed by Halle MILES   Accompanied by Partner     History of Present Illness       Reason for visit:  Hpylori    She eats 0-1 servings of fruits and vegetables daily.She consumes 0 sweetened beverage(s) daily.She exercises with enough effort to increase her heart rate 30 to 60 minutes per day.  She exercises with enough effort to increase her heart rate 5 days per week.   She is taking medications regularly.         GERD/Heartburn  Onset/Duration: 4 months  Description: stomach pain and hunger pains  Intensity: moderate  Progression of Symptoms: worsening and waxing and waning  Accompanying Signs & Symptoms:  Does it feel like food gets stuck or trouble swallowing: No  Nausea: No  Vomiting (bloody?): No  Abdominal Pain: YES- LUQ and epigastric  Black-Tarry stools: No  Bloody stools: No  History:  Previous similar episodes: YES, hx of h pylori in 2022  Previous ulcers: No  Precipitating factors:   Caffeine use: YES  Alcohol use: No  NSAID/Aspirin use: No  Tobacco use: No  Worse with no particular food  "or drink.  Alleviating factors: None  Therapies tried and outcome:             Lifestyle changes: None            Medications: none            Objective    /67 (BP Location: Right arm, Patient Position: Sitting, Cuff Size: Adult Regular)   Pulse 84   Temp 97.6  F (36.4  C) (Temporal)   Resp 16   Ht 1.676 m (5' 6\")   Wt 67.1 kg (148 lb)   LMP 02/15/2025 (Exact Date)   SpO2 97%   BMI 23.89 kg/m    Body mass index is 23.89 kg/m .  Physical Exam   GENERAL: alert and no distress  RESP: lungs clear to auscultation - no rales, rhonchi or wheezes  CV: regular rate and rhythm, normal S1 S2, no S3 or S4, no murmur, click or rub, no peripheral edema  ABDOMEN: soft, nontender, no hepatosplenomegaly, no masses and bowel sounds normal            Signed Electronically by: Robert Lyon PA-C    "

## 2025-02-26 PROCEDURE — 87338 HPYLORI STOOL AG IA: CPT | Performed by: PHYSICIAN ASSISTANT

## 2025-02-27 LAB — H PYLORI AG STL QL IA: NEGATIVE

## 2025-02-28 ENCOUNTER — MYC MEDICAL ADVICE (OUTPATIENT)
Dept: FAMILY MEDICINE | Facility: CLINIC | Age: 45
End: 2025-02-28
Payer: COMMERCIAL

## 2025-02-28 NOTE — TELEPHONE ENCOUNTER
Robert: normally would respond harmless transient value (3.4)  but two years ago was low as well (3.3) - any comments/thoughts?    Hi Doctor,  My lab work for WBC is showing low, what should I do or do I have any medication to take?    LIS Handley, BSN, PHN, RN-[facility]  [phone]

## 2025-08-10 ENCOUNTER — HEALTH MAINTENANCE LETTER (OUTPATIENT)
Age: 45
End: 2025-08-10